# Patient Record
(demographics unavailable — no encounter records)

---

## 2024-10-15 NOTE — ASSESSMENT
[FreeTextEntry1] : He presents today accompanied by his daughter who helps provide translation.  He has longstanding progressive hearing loss for which he wears hearing aids with limited benefit.   Otoscopic exam shows intact bilateral tympanic membranes without effusion or retraction, and bilateral facial nerve function is normal.  Bilateral ear canals are widely patent.  I reviewed an audiogram from outside office which shows bilateral severe sensorineural hearing loss.  I reviewed other outside records including outside ENT office notes recommending evaluation for consideration of CI.   I did discuss with him and his daughter the option of cochlear implant surgery and provided details regarding CI surgery and the postoperative aural rehabilitation process.  They are interested in moving forward with formal CI evaluation and CT/MRI, and will follow-up after testing is complete for further discussion.  As per his daughter, the patient also has possible cognitive impairment and we will arrange for neurology evaluation as well.

## 2024-10-15 NOTE — REASON FOR VISIT
[Initial Consultation] : an initial consultation for [Family Member] : family member [Other: _____] : [unfilled] [Source: ______] : History obtained from [unfilled] [FreeTextEntry2] : hearing loss

## 2024-10-15 NOTE — HISTORY OF PRESENT ILLNESS
[de-identified] : 70 year old male referred by Dr Demetrio CONDE presents for hearing loss.  Reports that he has been having difficulty hearing and processing sounds for a long time. Has been wearing hearing aids and has been having trouble understanding the sounds. Reports that it is worsening and disrupting his quality of life. Would like to discuss cochlear implant.  History of hearing loss with his father when he was middle age. No imaging.  No otalgia, otorrhea, ear infections.

## 2024-10-15 NOTE — CONSULT LETTER
[FreeTextEntry2] : Demetrio Torres MD [FreeTextEntry1] : Dear Demetrio,  Thanks for referring Ernestina Conley for evaluation of his bilateral sensorineural hearing loss.  He presents today accompanied by his daughter who helps provide translation.  He has longstanding progressive hearing loss for which he wears hearing aids with limited benefit.  I did discuss with him and his daughter the option of cochlear implant surgery and provided details regarding CI surgery and the postoperative aural rehabilitation process.  They are interested in moving forward with formal CI evaluation and imaging, and will follow-up after testing is complete for further discussion.  As per his daughter, the patient also has possible cognitive impairment and we will arrange for neurology evaluation as well.  Thank you once again for the opportunity to participate in your patient's care, and I will keep you informed as to his progress.  Best regards,  Myron Jackson MD Otology/Neurotology [facility]

## 2024-11-18 NOTE — HISTORY OF PRESENT ILLNESS
[FreeTextEntry1] : Informant: patient &.daughter   VÍCTOR SANDOVAL is a 70-year man who is here for cognitive evaluation. Daughter reports memory loss over past 2 years.   She said "he has ticks in his body" all over on face, stomach, shoulder, legs for decades.  He forgets day of week.  He forgets close refrigerator.  He loses things.   He forgets conversations, events, repeats and asks same questions.  He has been depressed for decades worse since immigrated to [place]24.  He hasn't worked in 15 years because of depression and learning issues.  He has some intellectual disability described as difficult to learn and she thinks low IQ.  He used to be more irritable yelling, and he is quieter. He doesn't have cell phone because can't use daughter said because he was never smart enough. Also, difficulty with TV remote.  He needs motivation and prompting to shower and bath.  He took prozac for 10 days but refused because he didnt think it was working.  MRI done ordered by Dr Jackson ENT for hearing loss. He has not seen a neurologist in [place]  Saw neurologist in [place] 16 years for aggressive behaviors.  EEG reportedly told the brain has cerebrovascular disease.   Neuro ROS: -Hx head trauma: at age 10 fell down stairs - no loc bruise on forehead  -developmental/birth defects: see hpi  -Headache: migraines past - resolved  -Incontinence: denies -Vertigo/lightheadedness: denies -Seizures: denies -Weakness: denies -Sensory changes: denies numbness or paresthesia's -Changes in taste/smell: denies -Visual changes: denies -Gait/Balance/falls: occasional loss of balance, walks slower, some falls  -Tremor/abnormal movements: see hpi -Dysphagia: denies -Syncope/autonomic dysfunction: denies   Neuropsychiatric: -Sleep:  +difficulty falling & staying asleep. Denies verbalizations, movements, abnormal dreams movements or snoring, -Appetite: denies weight or appetite changes -Anxiety: + anxiety -Depression/Apathy: denies changes in interests, energy,  -Suicidal/Homicidal ideations: denies -Hallucinations/illusions: denies     PHQ2 over the last 2 weeks do u have?  (0-none, 1-several days, 2-more than half days, 3-nearly every day) -little interest or pleasure in doing things: 3 -feeling down, depressed, or hopeless: 3  PHQ9  Not at all Several days More than half the days Nearly every day Little interest or pleasure in doing things 3 Feeling down, depressed, or hopeless 3 Trouble falling or staying asleep, or sleeping too much 3 Feeling tired or having little energy 3 Poor appetite or overeating 0 Feeling bad about yourself or that you are a failure or have let yourself or your family down 3 Trouble concentrating on things, such as reading the newspaper or watching television  3 Moving or speaking so slowly that other people could have noticed. Or the opposite being so figety or restless that you have been moving around a lot more than usual 3- slower  Thoughts that you would be better off dead, or of hurting yourself 0  FUNCTIONALITY  QUESTIONS  ACTIVITIES of DAILY LIVING ([facility])         (0) needs help (1) independent Bathing/Showerin Dressin Toiletin Transferrin Continence: 1 Feedin   INDEPENDENT ACTIVITIES of DAILY LIVING ([facility])      (0) unable (2) needs assistance (3) independent Ability to Use Telephone:  cant use  Shopping: Food Preparation: Housekeepin  Laundry: Transportation: Responsibility for Own Medications: Ability to Handle Finances: wife did    SOCIAL HX -Smoking Hx: denies -Illicit drugs: denies -Alcohol Hx: denies  -Birthplace: [facility], moved to  [place]  -Marital status:  -Lives with: wife  -Children: 2 -Occupation: retired  Education: went to high school didnt finish    PMHx -DM2 -? intestinal issues  -hearing loss - wears hearing aides   PSH  -intestinal surgery  FAMILY HX -Dementia: father    ALLERGIES  - NKA  MEDs  - metformin -?? -vit D  ROS Constitutional:  No fevers or chills. Eye: No blurred vision, diplopia, eye pain or visual problems. Head/Ear/Nose/Throat: +hearing loss, Respiratory: No cough, wheezing or shortness of breath. Cardiovascular: No chest pain, palpitations or dyspnea on exertion. Gastrointestinal: No nausea, vomiting, or diarrhea.+ constipation  Genitourinary: No incontinence, urgency or frequency. Integument/breast: No skin lesions. Hematologic/lymphatic: No blood clots, easy bruising/bleeding or anemia. Endocrine: + diabetes. Musculoskeletal: +OA Neurological: see HPI Psychiatric: see HPI   GENERAL EXAMINATION General:  Pleasant, well groomed, and in no apparent distress. Skin: Anicteric with no significant lesions noted. Eyes: Anicteric Head/Ears/Mouth/Nose/Throat: NC/AT, conjunctiva clear. Neck: Supple, full ROM. Respiratory: No respiratory distress Cardiovascular: Regular rate and rhythm. Gastrointestinal: Soft, non-tender, non-distended with no masses. Extremities: No edema or calf tenderness, Back: No deformities, no spinal tenderness   NEUROLOGIC EXAMINATION Mental Status: awake, alert, pleasant, Cranial Nerves: VFF PERRL, EOMI without nystagmus, facial sensation intact, face symmetric, hearing intact to fingerrub, palate raises symmetrically, shoulder shrug intact, tongue midline. No dysarthria. Motor: -Tone:  normal -Strength: No pronator drift. Strength is 5/5 in bilateral upper and lower extremities Adventitial movements: No tremors noted. Sensation: grossly intact. Romberg is negative. Reflexes: hypo reflexive  Coordination: Intact with finger-to-nose bilaterally. Gait: Steady, with a narrow base. Able to walk on heels and toes. Able to tandem.  Normal pull test Frontal release signs: No grasp reflex. No palmomental reflex. No glabellar reflex.  workup MRI brain 2024 A few small rounded nonspecific abnormal white matter foci of T2/FLAIR prolongation statistically favoring microvascular type changes. MMSE : 20/30 orientation 4/10 knew year, day, country state. serial 7 - unable, recall 3/3  labs 2024 done by pmd  vit d low, b12 tsh reportedly normal    ASSESSMENT: Patient with lifelong cognitive symptoms worse over past few years.  History difficult to obtain as recently immigrated from [place] and baseline unclear. Also depression may be a major contributing factor    PLAN: Cognitive symptoms: -not candidate lecanamab: low mmse, depression -refer to psychiatrist - start escitalopram  -no Neuropsychological tests: too low MMSE,    Follow-up: 6-12 months

## 2024-11-18 NOTE — HISTORY OF PRESENT ILLNESS
[FreeTextEntry1] : Informant: patient &.daughter   VÍCTOR SANDOVAL is a 70-year man who is here for cognitive evaluation. Daughter reports memory loss over past 2 years.   She said "he has ticks in his body" all over on face, stomach, shoulder, legs for decades.  He forgets day of week.  He forgets close refrigerator.  He loses things.   He forgets conversations, events, repeats and asks same questions.  He has been depressed for decades worse since immigrated to [place].  He hasn't worked in 15 years because of depression and learning issues.  He has some intellectual disability described as difficult to learn and she thinks low IQ.  He used to be more irritable yelling, and he is quieter. He doesn't have cell phone because can't use daughter said because he was never smart enough. Also, difficulty with TV remote.  He needs motivation and prompting to shower and bath.  He took prozac for 10 days but refused because he didnt think it was working.  MRI done ordered by Dr Jackson ENT for hearing loss. He has not seen a neurologist in [place]  Saw neurologist in [place] 16 years for aggressive behaviors.  EEG reportedly told the brain has cerebrovascular disease.   Neuro ROS: -Hx head trauma: at age 10 fell down stairs - no loc bruise on forehead  -developmental/birth defects: see hpi  -Headache: migraines past - resolved  -Incontinence: denies -Vertigo/lightheadedness: denies -Seizures: denies -Weakness: denies -Sensory changes: denies numbness or paresthesia's -Changes in taste/smell: denies -Visual changes: denies -Gait/Balance/falls: occasional loss of balance, walks slower, some falls  -Tremor/abnormal movements: see hpi -Dysphagia: denies -Syncope/autonomic dysfunction: denies   Neuropsychiatric: -Sleep:  +difficulty falling & staying asleep. Denies verbalizations, movements, abnormal dreams movements or snoring, -Appetite: denies weight or appetite changes -Anxiety: + anxiety -Depression/Apathy: denies changes in interests, energy,  -Suicidal/Homicidal ideations: denies -Hallucinations/illusions: denies     PHQ2 over the last 2 weeks do u have?  (0-none, 1-several days, 2-more than half days, 3-nearly every day) -little interest or pleasure in doing things: 3 -feeling down, depressed, or hopeless: 3  PHQ9  Not at all Several days More than half the days Nearly every day Little interest or pleasure in doing things 3 Feeling down, depressed, or hopeless 3 Trouble falling or staying asleep, or sleeping too much 3 Feeling tired or having little energy 3 Poor appetite or overeating 0 Feeling bad about yourself or that you are a failure or have let yourself or your family down 3 Trouble concentrating on things, such as reading the newspaper or watching television  3 Moving or speaking so slowly that other people could have noticed. Or the opposite being so figety or restless that you have been moving around a lot more than usual 3- slower  Thoughts that you would be better off dead, or of hurting yourself 0  FUNCTIONALITY  QUESTIONS  ACTIVITIES of DAILY LIVING ([facility])         (0) needs help (1) independent Bathing/Showerin Dressin Toiletin Transferrin Continence: 1 Feedin   INDEPENDENT ACTIVITIES of DAILY LIVING ([place])      (0) unable (2) needs assistance (3) independent Ability to Use Telephone:  cant use  Shopping: Food Preparation: Housekeepin  Laundry: Transportation: Responsibility for Own Medications: Ability to Handle Finances: wife did    SOCIAL HX -Smoking Hx: denies -Illicit drugs: denies -Alcohol Hx: denies  -Birthplace: [facility], moved to  [place]24 for [place] asylum  -Marital status:  -Lives with: wife  -Children: 2 -Occupation: retired  Education: went to high school didnt finish    PMHx -DM2 -? intestinal issues  -hearing loss - wears hearing aides   PSH  -intestinal surgery  FAMILY HX -Dementia: father    ALLERGIES  - NKA  MEDs  - metformin -?? -vit D  ROS Constitutional:  No fevers or chills. Eye: No blurred vision, diplopia, eye pain or visual problems. Head/Ear/Nose/Throat: +hearing loss, Respiratory: No cough, wheezing or shortness of breath. Cardiovascular: No chest pain, palpitations or dyspnea on exertion. Gastrointestinal: No nausea, vomiting, or diarrhea.+ constipation  Genitourinary: No incontinence, urgency or frequency. Integument/breast: No skin lesions. Hematologic/lymphatic: No blood clots, easy bruising/bleeding or anemia. Endocrine: + diabetes. Musculoskeletal: +OA Neurological: see HPI Psychiatric: see HPI   GENERAL EXAMINATION General:  Pleasant, well groomed, and in no apparent distress. Skin: Anicteric with no significant lesions noted. Eyes: Anicteric Head/Ears/Mouth/Nose/Throat: NC/AT, conjunctiva clear. Neck: Supple, full ROM. Respiratory: No respiratory distress Cardiovascular: Regular rate and rhythm. Gastrointestinal: Soft, non-tender, non-distended with no masses. Extremities: No edema or calf tenderness, Back: No deformities, no spinal tenderness   NEUROLOGIC EXAMINATION Mental Status: awake, alert, pleasant, Cranial Nerves: VFF PERRL, EOMI without nystagmus, facial sensation intact, face symmetric, hearing intact to fingerrub, palate raises symmetrically, shoulder shrug intact, tongue midline. No dysarthria. Motor: -Tone:  normal -Strength: No pronator drift. Strength is 5/5 in bilateral upper and lower extremities Adventitial movements: No tremors noted. Sensation: grossly intact. Romberg is negative. Reflexes: hypo reflexive  Coordination: Intact with finger-to-nose bilaterally. Gait: Steady, with a narrow base. Able to walk on heels and toes. Able to tandem.  Normal pull test Frontal release signs: No grasp reflex. No palmomental reflex. No glabellar reflex.  workup MRI brain 2024 A few small rounded nonspecific abnormal white matter foci of T2/FLAIR prolongation statistically favoring microvascular type changes. MMSE : 20/30 orientation 4/10 knew year, day, country state. serial 7 - unable, recall 3/3  labs 2024 done by pmd  vit d low, b12 tsh reportedly normal    ASSESSMENT: Patient with lifelong cognitive symptoms worse over past few years.  History difficult to obtain as recently immigrated from [place] and baseline unclear. Also depression may be a major contributing factor    PLAN: Cognitive symptoms: -not candidate lecanamab: low mmse, depression -refer to psychiatrist - start escitalopram  -no Neuropsychological tests: too low MMSE,    Follow-up: 6-12 months